# Patient Record
Sex: FEMALE | Race: NATIVE HAWAIIAN OR OTHER PACIFIC ISLANDER
[De-identification: names, ages, dates, MRNs, and addresses within clinical notes are randomized per-mention and may not be internally consistent; named-entity substitution may affect disease eponyms.]

---

## 2017-07-30 ENCOUNTER — HOSPITAL ENCOUNTER (EMERGENCY)
Dept: HOSPITAL 31 - C.ER | Age: 57
Discharge: HOME | End: 2017-07-30
Payer: COMMERCIAL

## 2017-07-30 VITALS — BODY MASS INDEX: 19.5 KG/M2

## 2017-07-30 VITALS — SYSTOLIC BLOOD PRESSURE: 123 MMHG | HEART RATE: 84 BPM | DIASTOLIC BLOOD PRESSURE: 78 MMHG | RESPIRATION RATE: 18 BRPM

## 2017-07-30 VITALS — TEMPERATURE: 98 F | OXYGEN SATURATION: 98 %
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
## 2017-07-30 DIAGNOSIS — S80.812A: Primary | ICD-10-CM

## 2017-07-30 NOTE — C.PDOC
History Of Present Illness


56 y/o female presents to the ED for evaluation after being bit to the left leg 

by a stray cat this morning at 9:30. States she is UTD with tetanus 

vaccination. Patient states she washed the area with soap and water. Otherwise, 

denies any sensory changes, or any other complaints at this time.





Time Seen by Provider: 07/30/17 14:35


Chief Complaint (Nursing): Bite


History Per: Patient


History/Exam Limitations: no limitations


Onset/Duration Of Symptoms: Hrs


Current Symptoms Are (Timing): Still Present


Location Of Injury: Left: Leg


Quality Of Symptoms: denies: Painful, Itching, Swollen, Draining


Severity: None


Pain Scale Rating Of: 0


Recent travel outside of the [place]: No


Additional History Per: Patient





- Animal Bite


Description Of The Animal: Stray


Reports Animal Appears: Unknown


Reports Animal's Immunization Status: Unknown


Animal Control Notified: No





Past Medical History


Reviewed: Historical Data, Nursing Documentation, Vital Signs


Vital Signs: 


 Last Vital Signs











Temp  98.0 F   07/30/17 14:27


 


Pulse  84   07/30/17 14:46


 


Resp  18   07/30/17 14:46


 


BP  123/78   07/30/17 14:46


 


Pulse Ox  98   07/30/17 15:01














- Medical History


PMH: Gastritis, Gall Bladder Disease (CHOLELITHIASIS), Osteoporosis


   Denies: Chronic Kidney Disease


Surgical History: Cholecystectomy


Family History: States: Unknown Family Hx





- Social History


Hx Alcohol Use: No


Hx Substance Use: No





- Immunization History


Hx Tetanus Toxoid Vaccination: Yes (2015)


Hx Influenza Vaccination: No


Hx Pneumococcal Vaccination: No





Review Of Systems


Except As Marked, All Systems Reviewed And Found Negative.


Constitutional: Negative for: Fever, Chills


Musculoskeletal: Negative for: Leg Pain


Skin: Positive for: Other (redness to left leg).  Negative for: Rash, Bruising


Neurological: Negative for: Weakness, Numbness





Physical Exam





- Physical Exam


Appears: Non-toxic, No Acute Distress


Skin: Warm, Dry, Other (mild erythema to left leg, no break in skin, no 

puncture wound)


Head: Atraumatic, Normacephalic


Eye(s): bilateral: Normal Inspection


Neck: Normal ROM


Extremity: Normal ROM, No Tenderness, Capillary Refill (< 2 sec.), No Deformity

, No Swelling


Pulses: Left Dorsalis Pedis: Normal, Right Dorsalis Pedis: Normal


Neurological/Psych: Oriented x3, Normal Speech, Normal Motor, Normal Sensation


Gait: Steady





ED Course And Treatment


O2 Sat by Pulse Oximetry: 98 (RA)


Pulse Ox Interpretation: Normal


Progress Note: Patient was instructed to keep area clean and dry, and to return 

to ER for worsening symptoms including redness, streaking,discharge or 

swelling. Considering there is no break in skin no indication for rabies 

vaccination.





Disposition


Counseled Patient/Family Regarding: Need For Followup





- Disposition


Disposition: HOME/ ROUTINE


Disposition Time: 14:40


Condition: STABLE


Additional Instructions: 


Keep area clean and dry. May wash gently with soap and water, do not use 

alcohol or iodine solution. Return to ER if fever occurs, redness or swelling 

around wound, pus in the wound.  


Instructions:  Animal Bite (ED)


Forms:  [facility] (English)





- POA


Present On Arrival: None





- Clinical Impression


Clinical Impression: 


 Superficial abrasion








- PA / NP / Resident Statement


MD/DO has reviewed & agrees with the documentation as recorded.





- Scribe Statement


The provider has reviewed the documentation as recorded by the Scribe





Candida Wheeler





All medical record entries made by the Deidraibe were at my direction and 

personally dictated by me. I have reviewed the chart and agree that the record 

accurately reflects my personal performance of the history, physical exam, 

medical decision making, and the department course for this patient. I have 

also personally directed, reviewed, and agree with the discharge instructions 

and disposition.

## 2018-07-16 ENCOUNTER — HOSPITAL ENCOUNTER (OUTPATIENT)
Dept: HOSPITAL 31 - C.ENDO | Age: 58
Discharge: HOME | End: 2018-07-16
Attending: INTERNAL MEDICINE
Payer: COMMERCIAL

## 2018-07-16 VITALS — OXYGEN SATURATION: 100 % | TEMPERATURE: 97.8 F

## 2018-07-16 VITALS — SYSTOLIC BLOOD PRESSURE: 106 MMHG | RESPIRATION RATE: 16 BRPM | DIASTOLIC BLOOD PRESSURE: 60 MMHG | HEART RATE: 77 BPM

## 2018-07-16 VITALS — BODY MASS INDEX: 20.3 KG/M2

## 2018-07-16 DIAGNOSIS — Z12.11: Primary | ICD-10-CM

## 2018-07-16 DIAGNOSIS — J45.909: ICD-10-CM

## 2018-07-16 DIAGNOSIS — K64.1: ICD-10-CM

## 2018-07-16 PROCEDURE — 45378 DIAGNOSTIC COLONOSCOPY: CPT

## 2018-07-16 NOTE — CP.SDSHP
[facility]





- History


Proposed Procedure: Screening for colon cancer


Pre-Op Diagnosis: Average risk screening for colon cancer





- Previous Medical/Surgical History


Pulmonary: Asthma





- Allergies


Allergies: 


Allergies





metronidazole Allergy (Verified 06/20/16 07:35)


 RASH











- Current Medications


Current Medications: 





See reconciliation sheet





- Physical Exam


General Appearance: WD WN female in NAD


Vital Signs: 


 Vital Signs











  07/16/18





  07:50


 


Temperature 98.2 F


 


Pulse Rate 80


 


Respiratory 20





Rate 


 


Blood Pressure 132/88


 


O2 Sat by Pulse 97





Oximetry 











Mental Status: Alert & Oriented x3


Neuro: WNL


Heart: WNL


Lungs: WNL


GI: WNL





- {Optional Preform as Required}


Abdomen: WNL





- Impression


Impression: Average risk screening for colon cancer


Pt. Evaluated Today:Candidate for Anesthesia & Procedure: Yes





- Date & Time


Date: 07/16/18


Time: 08:45





Short Stay Discharge





- Short Stay Discharge


Admitting Diagnosis/Reason for Visit: SCREENING


Disposition: HOME/ ROUTINE

## 2019-01-10 ENCOUNTER — HOSPITAL ENCOUNTER (EMERGENCY)
Dept: HOSPITAL 31 - C.ER | Age: 59
Discharge: HOME | End: 2019-01-10
Payer: COMMERCIAL

## 2019-01-10 VITALS
HEART RATE: 90 BPM | DIASTOLIC BLOOD PRESSURE: 79 MMHG | SYSTOLIC BLOOD PRESSURE: 133 MMHG | RESPIRATION RATE: 18 BRPM | TEMPERATURE: 98 F | OXYGEN SATURATION: 98 %

## 2019-01-10 VITALS — BODY MASS INDEX: 19.6 KG/M2

## 2019-01-10 DIAGNOSIS — R42: Primary | ICD-10-CM

## 2019-01-10 LAB
ALBUMIN SERPL-MCNC: 4.9 G/DL (ref 3.5–5)
ALBUMIN/GLOB SERPL: 1.6 {RATIO} (ref 1–2.1)
ALT SERPL-CCNC: 55 U/L (ref 9–52)
AST SERPL-CCNC: 36 U/L (ref 14–36)
BASOPHILS # BLD AUTO: 0 K/UL (ref 0–0.2)
BASOPHILS NFR BLD: 0.7 % (ref 0–2)
BUN SERPL-MCNC: 14 MG/DL (ref 7–17)
CALCIUM SERPL-MCNC: 9.4 MG/DL (ref 8.6–10.4)
EOSINOPHIL # BLD AUTO: 0.1 K/UL (ref 0–0.7)
EOSINOPHIL NFR BLD: 1.7 % (ref 0–4)
ERYTHROCYTE [DISTWIDTH] IN BLOOD BY AUTOMATED COUNT: 12 % (ref 11.5–14.5)
GFR NON-AFRICAN AMERICAN: > 60
HGB BLD-MCNC: 14.7 G/DL (ref 11–16)
LYMPHOCYTES # BLD AUTO: 1.4 K/UL (ref 1–4.3)
LYMPHOCYTES NFR BLD AUTO: 29.5 % (ref 20–40)
MCH RBC QN AUTO: 30.3 PG (ref 27–31)
MCHC RBC AUTO-ENTMCNC: 34.1 G/DL (ref 33–37)
MCV RBC AUTO: 88.9 FL (ref 81–99)
MONOCYTES # BLD: 0.4 K/UL (ref 0–0.8)
MONOCYTES NFR BLD: 7.6 % (ref 0–10)
NEUTROPHILS # BLD: 2.9 K/UL (ref 1.8–7)
NEUTROPHILS NFR BLD AUTO: 60.5 % (ref 50–75)
NRBC BLD AUTO-RTO: 0.1 % (ref 0–2)
PLATELET # BLD: 156 K/UL (ref 130–400)
PMV BLD AUTO: 10.3 FL (ref 7.2–11.7)
RBC # BLD AUTO: 4.84 MIL/UL (ref 3.8–5.2)
WBC # BLD AUTO: 4.8 K/UL (ref 4.8–10.8)

## 2019-01-10 NOTE — C.PDOC
History Of Present Illness


58 year old female presents to the ED for evaluation of pelvic pain which had 

sudden onset around 3 days ago. Patient reports constant pain that has been 

worsening since onset. Patient states she was evaluated 


Time Seen by Provider: 01/10/19 13:02


Chief Complaint (Nursing): Dizziness/Lightheaded





Past Medical History


Vital Signs: 





                                Last Vital Signs











Temp  98.0 F   01/10/19 14:30


 


Pulse  90   01/10/19 14:30


 


Resp  18   01/10/19 14:30


 


BP  133/79   01/10/19 14:30


 


Pulse Ox  98   01/10/19 14:30














- Medical History


PMH: Gastritis, Gall Bladder Disease (CHOLELITHIASIS), Osteoporosis


   Denies: Chronic Kidney Disease


Surgical History: Cholecystectomy (LAPROSCOPIC), Endoscopy


   Denies: Pacemaker


Family History: States: Unknown Family Hx





- Social History


Hx Alcohol Use: No


Hx Substance Use: No





- Immunization History


Hx Tetanus Toxoid Vaccination: Yes (2015)


Hx Influenza Vaccination: Yes


Hx Pneumococcal Vaccination: No





ED Course And Treatment





- Laboratory Results


Result Diagrams: 


                                 01/10/19 14:38





                                 01/10/19 14:38


Lab Results: 





                                        











Total Bilirubin  0.6 mg/dL (0.2-1.3)   01/10/19  14:38    


 


AST  36 U/L (14-36)   01/10/19  14:38    


 


ALT  55 U/L (9-52)  H D 01/10/19  14:38    


 


Alkaline Phosphatase  116 U/L ()   01/10/19  14:38    


 


Total Protein  7.9 g/dL (6.3-8.3)   01/10/19  14:38    


 


Albumin  4.9 g/dL (3.5-5.0)   01/10/19  14:38    


 


Globulin  3.0 gm/dL (2.2-3.9)   01/10/19  14:38    


 


Albumin/Globulin Ratio  1.6  (1.0-2.1)   01/10/19  14:38    











O2 Sat by Pulse Oximetry: 98





Disposition





- Disposition


Referrals: 


Bonnie Vora MD [Staff Provider] - 01/11/19


Disposition: HOME/ ROUTINE


Disposition Time: 15:46


Condition: STABLE


Instructions:  Vertigo (a Type of Dizziness) (DC), Dizziness, Nonvertigo, (DC)

## 2019-01-10 NOTE — C.PDOC
History Of Present Illness


58 year old female, whose past medical history includes gastritis and vertigo, 

presents to the ED for evaluation of dizziness. Patient states symptoms have 

been ongoing since 12/31, and notes she experiences these symptoms around twice 

a year. She initially took half of a tablet of Dramamine, without relief. 

Patient then experienced nausea and multiple episodes of vomiting. Patient then 

took another half tablet of Dramamine without relief. Patient states she 

continued feeling lightheaded and episodes of feeling very hot. Patient denies 

chest pain, shortness of breath, headache, blurry vision, or focal weakness. 


Time Seen by Provider: 01/10/19 13:02


Chief Complaint (Nursing): Dizziness/Lightheaded


History Per: Patient


History/Exam Limitations: no limitations


Onset/Duration Of Symptoms: Days


Current Symptoms Are (Timing): Still Present





Past Medical History


Reviewed: Historical Data, Nursing Documentation, Vital Signs


Vital Signs: 





                                Last Vital Signs











Temp  98.0 F   01/10/19 14:30


 


Pulse  90   01/10/19 14:30


 


Resp  18   01/10/19 14:30


 


BP  133/79   01/10/19 14:30


 


Pulse Ox  98   01/10/19 14:30














- Medical History


PMH: Gastritis, Gall Bladder Disease (CHOLELITHIASIS), Osteoporosis


   Denies: Chronic Kidney Disease


Surgical History: Cholecystectomy (LAPROSCOPIC), Endoscopy


   Denies: Pacemaker


Family History: States: Unknown Family Hx





- Social History


Hx Alcohol Use: No


Hx Substance Use: No





- Immunization History


Hx Tetanus Toxoid Vaccination: Yes (2015)


Hx Influenza Vaccination: Yes


Hx Pneumococcal Vaccination: No





Review Of Systems


Eyes: Negative for: Other (blurry vision )


Cardiovascular: Negative for: Chest Pain


Respiratory: Negative for: Shortness of Breath


Gastrointestinal: Positive for: Vomiting


Neurological: Positive for: Dizziness, Other (lightheadedness ).  Negative for: 

Weakness, Headache





Physical Exam





- Physical Exam


Appears: Well, Non-toxic, No Acute Distress


Skin: Warm, Dry


Head: Atraumatic, Normacephalic


Eye(s): bilateral: Normal Inspection, PERRL, EOMI, Other (no nystagmus )


Oral Mucosa: Moist


Throat: No Erythema, No Exudate


Neck: Normal ROM, Trachea Midline


Lymphatic: No Adenopathy


Chest: Symmetrical


Cardiovascular: Rhythm Regular, No Murmur


Respiratory: Normal Breath Sounds, No Accessory Muscle Use


Gastrointestinal/Abdominal: Soft, No Tenderness


Extremity: Normal ROM, No Pedal Edema


Neurological/Psych: Oriented x3, Normal Speech, Normal Cognition, Normal Cranial

Nerves, Normal Motor, Normal Sensation, No Dysarthria, Other


Gait: Steady





ED Course And Treatment





- Laboratory Results


Result Diagrams: 


                                 01/10/19 14:38





                                 01/10/19 14:38


Lab Results: 





                                        











Total Bilirubin  0.6 mg/dL (0.2-1.3)   01/10/19  14:38    


 


AST  36 U/L (14-36)   01/10/19  14:38    


 


ALT  55 U/L (9-52)  H D 01/10/19  14:38    


 


Alkaline Phosphatase  116 U/L ()   01/10/19  14:38    


 


Total Protein  7.9 g/dL (6.3-8.3)   01/10/19  14:38    


 


Albumin  4.9 g/dL (3.5-5.0)   01/10/19  14:38    


 


Globulin  3.0 gm/dL (2.2-3.9)   01/10/19  14:38    


 


Albumin/Globulin Ratio  1.6  (1.0-2.1)   01/10/19  14:38    











O2 Sat by Pulse Oximetry: 98 (on RA)


Pulse Ox Interpretation: Normal





Medical Decision Making


Medical Decision Making: 





Impression: lightheadedness 


Differential Diagnoses include but are not limited to: vertigo, electrolyte 

abnormality, anemia, and dehydration 


Plan:


* bloodwork 


* reassess and disposition


Progress: 


Bloodwork ordered and reviewed. 


Minimal hypokalemia


DW pt findings and need for followup PMD.





Disposition





- Disposition


Referrals: 


oBnnie Vora MD [Staff Provider] - 01/11/19


Disposition: HOME/ ROUTINE


Disposition Time: 16:00


Condition: STABLE


Instructions:  Vertigo (a Type of Dizziness) (DC), Dizziness, Nonvertigo, (DC)





- Clinical Impression


Clinical Impression: 


 Dizziness